# Patient Record
Sex: FEMALE | Race: WHITE | HISPANIC OR LATINO
[De-identification: names, ages, dates, MRNs, and addresses within clinical notes are randomized per-mention and may not be internally consistent; named-entity substitution may affect disease eponyms.]

---

## 2022-06-13 PROBLEM — Z00.00 ENCOUNTER FOR PREVENTIVE HEALTH EXAMINATION: Status: ACTIVE | Noted: 2022-06-13

## 2022-06-15 ENCOUNTER — APPOINTMENT (OUTPATIENT)
Dept: ORTHOPEDIC SURGERY | Facility: CLINIC | Age: 62
End: 2022-06-15
Payer: COMMERCIAL

## 2022-06-15 VITALS — BODY MASS INDEX: 23.9 KG/M2 | HEIGHT: 64 IN | WEIGHT: 140 LBS

## 2022-06-15 DIAGNOSIS — E78.00 PURE HYPERCHOLESTEROLEMIA, UNSPECIFIED: ICD-10-CM

## 2022-06-15 DIAGNOSIS — Z78.9 OTHER SPECIFIED HEALTH STATUS: ICD-10-CM

## 2022-06-15 DIAGNOSIS — I10 ESSENTIAL (PRIMARY) HYPERTENSION: ICD-10-CM

## 2022-06-15 PROCEDURE — 72100 X-RAY EXAM L-S SPINE 2/3 VWS: CPT

## 2022-06-15 PROCEDURE — 99204 OFFICE O/P NEW MOD 45 MIN: CPT

## 2022-06-15 PROCEDURE — 73552 X-RAY EXAM OF FEMUR 2/>: CPT | Mod: LT

## 2022-06-15 RX ORDER — ROSUVASTATIN CALCIUM 5 MG/1
TABLET, FILM COATED ORAL
Refills: 0 | Status: ACTIVE | COMMUNITY

## 2022-06-15 RX ORDER — LISINOPRIL 30 MG/1
TABLET ORAL
Refills: 0 | Status: ACTIVE | COMMUNITY

## 2022-06-15 RX ORDER — METHYLPREDNISOLONE 4 MG/1
4 TABLET ORAL
Qty: 1 | Refills: 0 | Status: ACTIVE | COMMUNITY
Start: 2022-06-15 | End: 1900-01-01

## 2022-06-15 NOTE — REASON FOR VISIT
[FreeTextEntry2] : 63yo female with right sided buttock pain for a few weeks. Pain is worse after standing for long periods of time. Also reports left anterior thigh pain ongoing concurrently with associated numbness and tingling. Has tried topical ointments with mild relief. Had similar symptoms on the left in 2018 which resolved with PT.

## 2022-06-15 NOTE — HISTORY OF PRESENT ILLNESS
[Gradual] : gradual [6] : 6 [Burning] : burning [Shooting] : shooting [Tingling] : tingling [Intermittent] : intermittent [Household chores] : household chores [Leisure] : leisure [Work] : work [Social interactions] : social interactions [Nothing helps with pain getting better] : Nothing helps with pain getting better [Walking] : walking [Full time] : Work status: full time [de-identified] : Right hip pain for the last 2 month, no known injury. Left thigh has been burning and tingling again for the last 3 weeks. pain around mid thigh. walks 2-3 miles daily [] : no [FreeTextEntry1] : Right hip and left thigh [de-identified] : 2018 [de-identified] : Core [de-identified] : no recent testing

## 2022-06-15 NOTE — ASSESSMENT
[FreeTextEntry1] : lumbar patholoy, differential includes stress fx. MDP. PT. fu 2-3wk\par \par The patient was advised of the diagnosis.  The natural history of the pathology was explained in full. All questions were answered.  The risks and benefits of conservative and interventional treatment alternatives were explained to the patient\par \par

## 2022-07-12 ENCOUNTER — APPOINTMENT (OUTPATIENT)
Dept: ORTHOPEDIC SURGERY | Facility: CLINIC | Age: 62
End: 2022-07-12

## 2022-07-12 VITALS — WEIGHT: 140 LBS | BODY MASS INDEX: 23.9 KG/M2 | HEIGHT: 64 IN

## 2022-07-12 DIAGNOSIS — M79.652 PAIN IN LEFT THIGH: ICD-10-CM

## 2022-07-12 PROCEDURE — 99214 OFFICE O/P EST MOD 30 MIN: CPT

## 2022-07-12 NOTE — DISCUSSION/SUMMARY
[de-identified] : symptoms sound like lumbar pathology, but will get mri left hip and lumbar spine to ro occult stress fracture vs hnp given failure of conservative treatemnt. fu p mri\par \par The patient was advised of the diagnosis.  The natural history of the pathology was explained in full. All questions were answered.  The risks and benefits of conservative and interventional treatment alternatives were explained to the patient\par \par The patient was advised that an advanced imaging study (MRI/CT/etc) will be ordered to evaluate potential pathology in the affected area(s).  The patient was further advised to follow up in the office to review the results of the study and determine further management that may be indicated.\par \par

## 2022-07-12 NOTE — IMAGING
[de-identified] : Left hip exam: \par \par General: no distress, no ligamentous laxity\par Skin: no significant pertinent finding\par Inspection: no deformity, no swelling, no gross limb length discrepancy\par ROM: \par    Flexion: 100\par    ER: 50\par    IR: 20\par Tenderness: \par    Groin tenderness: (-)\par    Greater trochanteric tenderness: (-)\par    Buttock tenderness: (-)\par    IT Band tenderness: (-)\par    Anterior thigh tenderness: +\par    ASIS tenderness: (-)\par    Ischial tuberosity: (-)\par    Hamstring muscle tenderness: (-)\par Additional tests: \par    FADIR: +\par    LUCAS: (-)\par    Resisted hip flexion pain: +\par    Apprehension (external rotation/extension): (-)\par    Posterior pain with forced hip flexion and knee extension: (-)\par    Tight hamstrings: (-)\par    Log roll: (-)\par    Axial load: (-)\par Strength: 5/5 IP/Q/H/TA/GS/EHL\par Neuro: In tact to light touch throughout, DTR's wnl\par Vascularity: Extremity warm and well perfused\par Gait: mildy antalgic\par \par

## 2022-07-12 NOTE — HISTORY OF PRESENT ILLNESS
[Burning] : burning [Throbbing] : throbbing [Gradual] : gradual [6] : 6 [Shooting] : shooting [Tingling] : tingling [Intermittent] : intermittent [Household chores] : household chores [Leisure] : leisure [Work] : work [Social interactions] : social interactions [Nothing helps with pain getting better] : Nothing helps with pain getting better [Walking] : walking [Full time] : Work status: full time [de-identified] : Right hip pain for the last 2 month, no known injury. Left thigh has been burning and tingling again for the last 3 weeks. pain around mid thigh. walks 2-3 miles daily\par \par notes she has been doing PT and completed MDP withotu relief. no night pain. continued burning numbing and pain in left thigh area with walking, getting up . [] : Post Surgical Visit: no [de-identified] : 2018 [FreeTextEntry1] : Right hip and left thigh [de-identified] : Core [de-identified] : no recent testing [de-identified] : physical therapy

## 2022-09-12 ENCOUNTER — APPOINTMENT (OUTPATIENT)
Dept: ORTHOPEDIC SURGERY | Facility: CLINIC | Age: 62
End: 2022-09-12

## 2022-09-12 VITALS — HEIGHT: 64 IN | BODY MASS INDEX: 23.9 KG/M2 | WEIGHT: 140 LBS

## 2022-09-12 DIAGNOSIS — M54.16 RADICULOPATHY, LUMBAR REGION: ICD-10-CM

## 2022-09-12 DIAGNOSIS — M76.891 OTHER SPECIFIED ENTHESOPATHIES OF RIGHT LOWER LIMB, EXCLUDING FOOT: ICD-10-CM

## 2022-09-12 PROCEDURE — 99213 OFFICE O/P EST LOW 20 MIN: CPT

## 2022-09-12 NOTE — HISTORY OF PRESENT ILLNESS
[Gradual] : gradual [3] : 3 [0] : 0 [Dull/Aching] : dull/aching [Tingling] : tingling [Intermittent] : intermittent [Household chores] : household chores [Leisure] : leisure [Work] : work [Social interactions] : social interactions [Nothing helps with pain getting better] : Nothing helps with pain getting better [Walking] : walking [Full time] : Work status: full time [de-identified] : Right hip pain for the last 2 month, no known injury. Left thigh has been burning and tingling again for the last 3 weeks. pain around mid thigh. walks 2-3 miles daily\par \par notes she has been doing PT and completed MDP without relief. no night pain. continued burning numbing and pain in left thigh area with walking, getting up .\par \par 9/12/22: Here to follow up. Pain is much better since last visit with PT and now HEP\par  [] : Post Surgical Visit: no [FreeTextEntry1] : Right hip and left thigh [de-identified] : 2018 [de-identified] : Core [de-identified] : no recent testing [de-identified] : physical therapy

## 2022-09-12 NOTE — DATA REVIEWED
[MRI] : MRI [Report was reviewed and noted in the chart] : The report was reviewed and noted in the chart [I independently reviewed and interpreted images and report] : I independently reviewed and interpreted images and report

## 2022-09-12 NOTE — IMAGING
[de-identified] : Left hip exam: \par \par General: no distress, no ligamentous laxity\par Skin: no significant pertinent finding\par Inspection: no deformity, no swelling, no gross limb length discrepancy\par ROM: \par    Flexion: 100\par    ER: 50\par    IR: 20\par Tenderness: \par    Groin tenderness: (-)\par    Greater trochanteric tenderness: (-)\par    Buttock tenderness: (-)\par    IT Band tenderness: (-)\par    Anterior thigh tenderness: +\par    ASIS tenderness: (-)\par    Ischial tuberosity: (-)\par    Hamstring muscle tenderness: (-)\par Additional tests: \par    FADIR: +\par    LUCAS: (-)\par    Resisted hip flexion pain: -\par    Apprehension (external rotation/extension): (-)\par    Posterior pain with forced hip flexion and knee extension: (-)\par    Tight hamstrings: (-)\par    Log roll: (-)\par    Axial load: (-)\par Strength: 5/5 IP/Q/H/TA/GS/EHL\par Neuro: In tact to light touch throughout, DTR's wnl\par Vascularity: Extremity warm and well perfused\par Gait: mild antalgic\par \par

## 2022-09-12 NOTE — DISCUSSION/SUMMARY
[de-identified] : we reviewed MRIs and treatments. SHe is asymptomatic from hip oa and numbness as well as lateral hip pain have resolves. WIll continue wit HEP and follow up PRN\par \par